# Patient Record
Sex: FEMALE | Race: WHITE | Employment: STUDENT | URBAN - METROPOLITAN AREA
[De-identification: names, ages, dates, MRNs, and addresses within clinical notes are randomized per-mention and may not be internally consistent; named-entity substitution may affect disease eponyms.]

---
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
## 2022-07-11 ENCOUNTER — OFFICE VISIT (OUTPATIENT)
Dept: FAMILY MEDICINE CLINIC | Facility: CLINIC | Age: 22
End: 2022-07-11
Payer: COMMERCIAL

## 2022-07-11 VITALS
BODY MASS INDEX: 23.42 KG/M2 | HEIGHT: 65.83 IN | DIASTOLIC BLOOD PRESSURE: 68 MMHG | WEIGHT: 144 LBS | TEMPERATURE: 98 F | HEART RATE: 72 BPM | SYSTOLIC BLOOD PRESSURE: 96 MMHG

## 2022-07-11 DIAGNOSIS — Z23 NEED FOR VACCINATION: ICD-10-CM

## 2022-07-11 DIAGNOSIS — Z87.42 HISTORY OF MENORRHAGIA: ICD-10-CM

## 2022-07-11 DIAGNOSIS — Z79.899 NEW MEDICATION ADDED: ICD-10-CM

## 2022-07-11 DIAGNOSIS — K59.01 SLOW TRANSIT CONSTIPATION: ICD-10-CM

## 2022-07-11 DIAGNOSIS — R04.0 FREQUENT EPISTAXIS: ICD-10-CM

## 2022-07-11 DIAGNOSIS — D68.0 VON WILLEBRANDS DISEASE (HCC): ICD-10-CM

## 2022-07-11 DIAGNOSIS — Z00.00 WELL ADULT EXAM: Primary | ICD-10-CM

## 2022-07-11 DIAGNOSIS — R11.0 NAUSEA: ICD-10-CM

## 2022-07-11 PROCEDURE — 3074F SYST BP LT 130 MM HG: CPT | Performed by: FAMILY MEDICINE

## 2022-07-11 PROCEDURE — 3078F DIAST BP <80 MM HG: CPT | Performed by: FAMILY MEDICINE

## 2022-07-11 PROCEDURE — 99385 PREV VISIT NEW AGE 18-39: CPT | Performed by: FAMILY MEDICINE

## 2022-07-11 PROCEDURE — 90715 TDAP VACCINE 7 YRS/> IM: CPT | Performed by: FAMILY MEDICINE

## 2022-07-11 PROCEDURE — 99214 OFFICE O/P EST MOD 30 MIN: CPT | Performed by: FAMILY MEDICINE

## 2022-07-11 PROCEDURE — 3008F BODY MASS INDEX DOCD: CPT | Performed by: FAMILY MEDICINE

## 2022-07-11 PROCEDURE — 90471 IMMUNIZATION ADMIN: CPT | Performed by: FAMILY MEDICINE

## 2022-07-11 RX ORDER — L. ACIDOPHILUS/BIFID. ANIMALIS 2B CELL
1 CAPSULE ORAL DAILY
COMMUNITY

## 2022-07-11 RX ORDER — ONDANSETRON 4 MG/1
TABLET, FILM COATED ORAL EVERY 12 HOURS PRN
Qty: 20 TABLET | Refills: 0 | Status: SHIPPED | OUTPATIENT
Start: 2022-07-11

## 2022-07-12 PROBLEM — Z87.42 HISTORY OF MENORRHAGIA: Status: ACTIVE | Noted: 2022-07-12

## 2022-07-12 PROBLEM — K59.01 SLOW TRANSIT CONSTIPATION: Status: ACTIVE | Noted: 2022-07-12

## 2022-07-12 PROBLEM — R04.0 FREQUENT EPISTAXIS: Status: ACTIVE | Noted: 2022-07-12

## 2022-07-12 PROBLEM — R11.0 NAUSEA: Status: ACTIVE | Noted: 2022-07-12

## 2022-07-27 ENCOUNTER — APPOINTMENT (OUTPATIENT)
Dept: HEMATOLOGY/ONCOLOGY | Age: 22
End: 2022-07-27
Attending: INTERNAL MEDICINE
Payer: COMMERCIAL

## 2022-08-04 ENCOUNTER — LABORATORY ENCOUNTER (OUTPATIENT)
Dept: LAB | Age: 22
End: 2022-08-04
Attending: FAMILY MEDICINE
Payer: COMMERCIAL

## 2022-08-04 DIAGNOSIS — D68.0 VON WILLEBRANDS DISEASE (HCC): ICD-10-CM

## 2022-08-04 DIAGNOSIS — K59.01 SLOW TRANSIT CONSTIPATION: ICD-10-CM

## 2022-08-04 DIAGNOSIS — R11.0 NAUSEA: ICD-10-CM

## 2022-08-04 LAB
BASOPHILS # BLD AUTO: 0.03 X10(3) UL (ref 0–0.2)
BASOPHILS NFR BLD AUTO: 0.7 %
DEPRECATED HBV CORE AB SER IA-ACNC: 15.7 NG/ML
EOSINOPHIL # BLD AUTO: 0.07 X10(3) UL (ref 0–0.7)
EOSINOPHIL NFR BLD AUTO: 1.5 %
ERYTHROCYTE [DISTWIDTH] IN BLOOD BY AUTOMATED COUNT: 13.3 %
HCT VFR BLD AUTO: 42.7 %
HGB BLD-MCNC: 13.6 G/DL
IMM GRANULOCYTES # BLD AUTO: 0 X10(3) UL (ref 0–1)
IMM GRANULOCYTES NFR BLD: 0 %
IRON SATN MFR SERPL: 12 %
IRON SERPL-MCNC: 65 UG/DL
LIPASE SERPL-CCNC: 127 U/L (ref 73–393)
LYMPHOCYTES # BLD AUTO: 2.34 X10(3) UL (ref 1–4)
LYMPHOCYTES NFR BLD AUTO: 51.3 %
MCH RBC QN AUTO: 28.1 PG (ref 26–34)
MCHC RBC AUTO-ENTMCNC: 31.9 G/DL (ref 31–37)
MCV RBC AUTO: 88.2 FL
MONOCYTES # BLD AUTO: 0.43 X10(3) UL (ref 0.1–1)
MONOCYTES NFR BLD AUTO: 9.4 %
NEUTROPHILS # BLD AUTO: 1.69 X10 (3) UL (ref 1.5–7.7)
NEUTROPHILS # BLD AUTO: 1.69 X10(3) UL (ref 1.5–7.7)
NEUTROPHILS NFR BLD AUTO: 37.1 %
PLATELET # BLD AUTO: 227 10(3)UL (ref 150–450)
RBC # BLD AUTO: 4.84 X10(6)UL
TIBC SERPL-MCNC: 562 UG/DL (ref 240–450)
TRANSFERRIN SERPL-MCNC: 377 MG/DL (ref 200–360)
VIT B12 SERPL-MCNC: 327 PG/ML (ref 193–986)
WBC # BLD AUTO: 4.6 X10(3) UL (ref 4–11)

## 2022-08-04 PROCEDURE — 82785 ASSAY OF IGE: CPT | Performed by: FAMILY MEDICINE

## 2022-08-04 PROCEDURE — 82728 ASSAY OF FERRITIN: CPT | Performed by: FAMILY MEDICINE

## 2022-08-04 PROCEDURE — 86003 ALLG SPEC IGE CRUDE XTRC EA: CPT | Performed by: FAMILY MEDICINE

## 2022-08-04 PROCEDURE — 82607 VITAMIN B-12: CPT | Performed by: FAMILY MEDICINE

## 2022-08-04 PROCEDURE — 83540 ASSAY OF IRON: CPT | Performed by: FAMILY MEDICINE

## 2022-08-04 PROCEDURE — 83690 ASSAY OF LIPASE: CPT | Performed by: FAMILY MEDICINE

## 2022-08-04 PROCEDURE — 83550 IRON BINDING TEST: CPT | Performed by: FAMILY MEDICINE

## 2022-08-04 PROCEDURE — 85025 COMPLETE CBC W/AUTO DIFF WBC: CPT | Performed by: FAMILY MEDICINE

## 2022-08-06 NOTE — PROGRESS NOTES
Iron storage is on the low end of normal.  Eat foods that are rich in iron. Normal complete blood count white blood cells and red blood cell count normal  Lipase is normal no pancreas issues. Vitamin B12 is low goal above 400 start methylcobalamin chewable or sublingual (vitamin B12) 1,000 mcg daily. Vitamin B12 can help with energy, nerve pain and anxiety.

## 2022-08-08 LAB
CLAM IGE QN: <0.1 KUA/L (ref ?–0.1)
CODFISH IGE QN: <0.1 KUA/L (ref ?–0.1)
CORN IGE QN: <0.1 KUA/L (ref ?–0.1)
COW MILK IGE QN: <0.1 KUA/L (ref ?–0.1)
EGG WHITE IGE QN: <0.1 KUA/L (ref ?–0.1)
IGE SERPL-ACNC: 2.07 KU/L (ref 2–214)
PEANUT IGE QN: <0.1 KUA/L (ref ?–0.1)
SCALLOP IGE QN: <0.1 KUA/L (ref ?–0.1)
SESAME SEED IGE QN: <0.1 KUA/L (ref ?–0.1)
SHRIMP IGE QN: <0.1 KUA/L (ref ?–0.1)
SOYBEAN IGE QN: <0.1 KUA/L (ref ?–0.1)
WALNUT IGE QN: <0.1 KUA/L (ref ?–0.1)
WHEAT IGE QN: <0.1 KUA/L (ref ?–0.1)

## 2022-08-15 ENCOUNTER — OFFICE VISIT (OUTPATIENT)
Dept: HEMATOLOGY/ONCOLOGY | Facility: HOSPITAL | Age: 22
End: 2022-08-15
Attending: INTERNAL MEDICINE
Payer: COMMERCIAL

## 2022-08-15 VITALS
OXYGEN SATURATION: 98 % | WEIGHT: 146.38 LBS | BODY MASS INDEX: 23.81 KG/M2 | HEART RATE: 75 BPM | SYSTOLIC BLOOD PRESSURE: 126 MMHG | DIASTOLIC BLOOD PRESSURE: 84 MMHG | HEIGHT: 65.83 IN | TEMPERATURE: 99 F

## 2022-08-15 DIAGNOSIS — D50.9 IRON DEFICIENCY ANEMIA, UNSPECIFIED IRON DEFICIENCY ANEMIA TYPE: ICD-10-CM

## 2022-08-15 DIAGNOSIS — D68.0 VON WILLEBRANDS DISEASE (HCC): Primary | ICD-10-CM

## 2022-08-15 DIAGNOSIS — R04.0 FREQUENT EPISTAXIS: ICD-10-CM

## 2022-08-15 DIAGNOSIS — K06.8 GUMS, BLEEDING: ICD-10-CM

## 2022-08-15 DIAGNOSIS — Z87.42 HISTORY OF MENORRHAGIA: ICD-10-CM

## 2022-08-15 PROCEDURE — 99245 OFF/OP CONSLTJ NEW/EST HI 55: CPT | Performed by: INTERNAL MEDICINE

## 2022-08-15 RX ORDER — AMINOCAPROIC ACID 0.25 G/ML
4 SYRUP ORAL EVERY 6 HOURS PRN
Qty: 1 EACH | Refills: 1 | Status: SHIPPED | OUTPATIENT
Start: 2022-08-15 | End: 2022-08-16

## 2022-08-15 NOTE — PATIENT INSTRUCTIONS
For triage nurse: [phone] Monday through Friday 7:30-5:00.  *Please note this is a new phone number*    After hours or weekends for emergent needs:  [phone].      To schedule diagnostic testing: Central [phone]    For Medical Records: [phone]

## 2022-08-15 NOTE — PROGRESS NOTES
Outpatient / Education Record    Learner:   patient    Problems related to:    Disease/diagnosis    Barriers / Limitations:   None    Interventions/Method  Brief focused discussion, reinforcement  Medications/plan of care    Outcomes:  shows understanding    AVS provided and follow up reviewed. Pt instructed to call office for concerns. Maricruz Em

## 2022-08-16 RX ORDER — AMINOCAPROIC ACID 0.25 G/ML
4 SYRUP ORAL EVERY 6 HOURS PRN
Qty: 1 EACH | Refills: 1 | Status: SHIPPED | OUTPATIENT
Start: 2022-08-16

## 2022-08-18 ENCOUNTER — OFFICE VISIT (OUTPATIENT)
Dept: HEMATOLOGY/ONCOLOGY | Facility: HOSPITAL | Age: 22
End: 2022-08-18
Attending: INTERNAL MEDICINE
Payer: COMMERCIAL

## 2022-08-18 VITALS
TEMPERATURE: 98 F | HEART RATE: 60 BPM | OXYGEN SATURATION: 100 % | SYSTOLIC BLOOD PRESSURE: 138 MMHG | RESPIRATION RATE: 18 BRPM | DIASTOLIC BLOOD PRESSURE: 89 MMHG

## 2022-08-18 DIAGNOSIS — D50.9 IRON DEFICIENCY ANEMIA, UNSPECIFIED IRON DEFICIENCY ANEMIA TYPE: Primary | ICD-10-CM

## 2022-08-18 PROCEDURE — 96365 THER/PROPH/DIAG IV INF INIT: CPT

## 2022-08-18 PROCEDURE — 96376 TX/PRO/DX INJ SAME DRUG ADON: CPT

## 2022-08-18 RX ORDER — ONDANSETRON 4 MG/1
TABLET, FILM COATED ORAL
Qty: 20 TABLET | Refills: 0 | Status: SHIPPED | OUTPATIENT
Start: 2022-08-18

## 2022-08-18 NOTE — PROGRESS NOTES
Pt here for iron infusion. Arrives Ambulating independently, accompanied by self           Modifications in dose or schedule: No     Frequency of blood return and site check throughout administration: Prior to administration   Discharged to Home, Ambulating independently, accompanied by: self    Outpatient Oncology Care Plan  Problem list:  anemia  Problems related to:    disease/disease progression  Interventions:  administered iron  Expected outcomes:  optimal lab values  Progress towards outcome:  making progress    Education Record    Learner:  Patient  Barriers / Limitations:  None  Method:  Brief focused  Outcome:  Shows understanding  Comments:observed pt 30 min post infusion. Pt tolerated infusion. No c/o. VSS.  See flowsheet

## 2022-10-16 RX ORDER — ONDANSETRON 4 MG/1
TABLET, FILM COATED ORAL
Qty: 20 TABLET | Refills: 0 | Status: SHIPPED | OUTPATIENT
Start: 2022-10-16

## 2022-11-17 ENCOUNTER — APPOINTMENT (OUTPATIENT)
Dept: HEMATOLOGY/ONCOLOGY | Age: 22
End: 2022-11-17
Attending: INTERNAL MEDICINE
Payer: COMMERCIAL

## 2022-11-23 ENCOUNTER — NURSE ONLY (OUTPATIENT)
Dept: HEMATOLOGY/ONCOLOGY | Age: 22
End: 2022-11-23
Attending: INTERNAL MEDICINE
Payer: COMMERCIAL

## 2022-11-23 DIAGNOSIS — D50.9 IRON DEFICIENCY ANEMIA, UNSPECIFIED IRON DEFICIENCY ANEMIA TYPE: ICD-10-CM

## 2022-11-23 DIAGNOSIS — D68.00 VON WILLEBRANDS DISEASE: ICD-10-CM

## 2022-11-23 LAB
APTT PPP: 34.4 SECONDS (ref 23.3–35.6)
BASOPHILS # BLD AUTO: 0.01 X10(3) UL (ref 0–0.2)
BASOPHILS NFR BLD AUTO: 0.1 %
DEPRECATED HBV CORE AB SER IA-ACNC: 164.6 NG/ML
EOSINOPHIL # BLD AUTO: 0.06 X10(3) UL (ref 0–0.7)
EOSINOPHIL NFR BLD AUTO: 0.7 %
ERYTHROCYTE [DISTWIDTH] IN BLOOD BY AUTOMATED COUNT: 13 %
HCT VFR BLD AUTO: 41.5 %
HGB BLD-MCNC: 13.8 G/DL
IMM GRANULOCYTES # BLD AUTO: 0.02 X10(3) UL (ref 0–1)
IMM GRANULOCYTES NFR BLD: 0.2 %
IRON SERPL-MCNC: 66 UG/DL
LYMPHOCYTES # BLD AUTO: 2.55 X10(3) UL (ref 1–4)
LYMPHOCYTES NFR BLD AUTO: 30.1 %
MCH RBC QN AUTO: 28.5 PG (ref 26–34)
MCHC RBC AUTO-ENTMCNC: 33.3 G/DL (ref 31–37)
MCV RBC AUTO: 85.7 FL
MONOCYTES # BLD AUTO: 0.37 X10(3) UL (ref 0.1–1)
MONOCYTES NFR BLD AUTO: 4.4 %
NEUTROPHILS # BLD AUTO: 5.47 X10 (3) UL (ref 1.5–7.7)
NEUTROPHILS # BLD AUTO: 5.47 X10(3) UL (ref 1.5–7.7)
NEUTROPHILS NFR BLD AUTO: 64.5 %
PLATELET # BLD AUTO: 258 10(3)UL (ref 150–450)
RBC # BLD AUTO: 4.84 X10(6)UL
WBC # BLD AUTO: 8.5 X10(3) UL (ref 4–11)

## 2022-11-23 PROCEDURE — 85245 CLOT FACTOR VIII VW RISTOCTN: CPT

## 2022-11-23 PROCEDURE — 36415 COLL VENOUS BLD VENIPUNCTURE: CPT

## 2022-11-23 PROCEDURE — 85730 THROMBOPLASTIN TIME PARTIAL: CPT

## 2022-11-23 PROCEDURE — 85246 CLOT FACTOR VIII VW ANTIGEN: CPT

## 2022-11-23 PROCEDURE — 85025 COMPLETE CBC W/AUTO DIFF WBC: CPT

## 2022-11-23 PROCEDURE — 83540 ASSAY OF IRON: CPT

## 2022-11-23 PROCEDURE — 85240 CLOT FACTOR VIII AHG 1 STAGE: CPT

## 2022-11-23 PROCEDURE — 82728 ASSAY OF FERRITIN: CPT

## 2023-01-12 RX ORDER — ONDANSETRON 4 MG/1
TABLET, FILM COATED ORAL
Qty: 20 TABLET | Refills: 0 | Status: SHIPPED | OUTPATIENT
Start: 2023-01-12

## 2023-01-12 NOTE — TELEPHONE ENCOUNTER
Pt requesting refill of : Ondansetron Hydrochloride 4 Mg Tab Nort    Last Time Medication was Prescribed :  10/16/2022 qty 20   08/18/2022 qty 20  07/11/2022 qty 20 New patient    Last Office Visit with Provider: 07/11/2022     No future appointments.

## 2023-07-24 ENCOUNTER — LAB ENCOUNTER (OUTPATIENT)
Dept: LAB | Age: 23
End: 2023-07-24
Attending: FAMILY MEDICINE
Payer: COMMERCIAL

## 2023-07-24 ENCOUNTER — OFFICE VISIT (OUTPATIENT)
Dept: FAMILY MEDICINE CLINIC | Facility: CLINIC | Age: 23
End: 2023-07-24
Payer: COMMERCIAL

## 2023-07-24 VITALS
BODY MASS INDEX: 23.99 KG/M2 | TEMPERATURE: 98 F | WEIGHT: 144 LBS | OXYGEN SATURATION: 98 % | HEART RATE: 68 BPM | DIASTOLIC BLOOD PRESSURE: 78 MMHG | RESPIRATION RATE: 16 BRPM | HEIGHT: 65 IN | SYSTOLIC BLOOD PRESSURE: 140 MMHG

## 2023-07-24 DIAGNOSIS — Z13.29 SCREENING FOR ENDOCRINE, NUTRITIONAL, METABOLIC AND IMMUNITY DISORDER: ICD-10-CM

## 2023-07-24 DIAGNOSIS — Z00.00 WELL FEMALE EXAM WITHOUT GYNECOLOGICAL EXAM: Primary | ICD-10-CM

## 2023-07-24 DIAGNOSIS — Z79.899 MEDICATION MANAGEMENT: ICD-10-CM

## 2023-07-24 DIAGNOSIS — Z13.6 ENCOUNTER FOR LIPID SCREENING FOR CARDIOVASCULAR DISEASE: ICD-10-CM

## 2023-07-24 DIAGNOSIS — Z13.220 ENCOUNTER FOR LIPID SCREENING FOR CARDIOVASCULAR DISEASE: ICD-10-CM

## 2023-07-24 DIAGNOSIS — D50.9 IRON DEFICIENCY ANEMIA, UNSPECIFIED IRON DEFICIENCY ANEMIA TYPE: ICD-10-CM

## 2023-07-24 DIAGNOSIS — F41.1 GAD (GENERALIZED ANXIETY DISORDER): ICD-10-CM

## 2023-07-24 DIAGNOSIS — Z13.228 SCREENING FOR ENDOCRINE, NUTRITIONAL, METABOLIC AND IMMUNITY DISORDER: ICD-10-CM

## 2023-07-24 DIAGNOSIS — Z13.21 SCREENING FOR ENDOCRINE, NUTRITIONAL, METABOLIC AND IMMUNITY DISORDER: ICD-10-CM

## 2023-07-24 DIAGNOSIS — Z13.0 SCREENING FOR ENDOCRINE, NUTRITIONAL, METABOLIC AND IMMUNITY DISORDER: ICD-10-CM

## 2023-07-24 DIAGNOSIS — D68.00 VON WILLEBRANDS DISEASE (HCC): ICD-10-CM

## 2023-07-24 LAB
ALBUMIN SERPL-MCNC: 4.1 G/DL (ref 3.4–5)
ALBUMIN/GLOB SERPL: 1.3 {RATIO} (ref 1–2)
ALP LIVER SERPL-CCNC: 69 U/L
ALT SERPL-CCNC: 23 U/L
ANION GAP SERPL CALC-SCNC: 1 MMOL/L (ref 0–18)
AST SERPL-CCNC: 15 U/L (ref 15–37)
BASOPHILS # BLD AUTO: 0.03 X10(3) UL (ref 0–0.2)
BASOPHILS NFR BLD AUTO: 0.4 %
BILIRUB SERPL-MCNC: 0.3 MG/DL (ref 0.1–2)
BUN BLD-MCNC: 12 MG/DL (ref 7–18)
CALCIUM BLD-MCNC: 9.5 MG/DL (ref 8.5–10.1)
CHLORIDE SERPL-SCNC: 108 MMOL/L (ref 98–112)
CHOLEST SERPL-MCNC: 180 MG/DL (ref ?–200)
CO2 SERPL-SCNC: 27 MMOL/L (ref 21–32)
CREAT BLD-MCNC: 0.76 MG/DL
DEPRECATED HBV CORE AB SER IA-ACNC: 107.2 NG/ML
EGFRCR SERPLBLD CKD-EPI 2021: 114 ML/MIN/1.73M2 (ref 60–?)
EOSINOPHIL # BLD AUTO: 0.07 X10(3) UL (ref 0–0.7)
EOSINOPHIL NFR BLD AUTO: 0.9 %
ERYTHROCYTE [DISTWIDTH] IN BLOOD BY AUTOMATED COUNT: 13 %
FASTING PATIENT LIPID ANSWER: NO
FASTING STATUS PATIENT QL REPORTED: NO
GLOBULIN PLAS-MCNC: 3.2 G/DL (ref 2.8–4.4)
GLUCOSE BLD-MCNC: 92 MG/DL (ref 70–99)
HCT VFR BLD AUTO: 41.6 %
HDLC SERPL-MCNC: 60 MG/DL (ref 40–59)
HGB BLD-MCNC: 13.6 G/DL
IMM GRANULOCYTES # BLD AUTO: 0.01 X10(3) UL (ref 0–1)
IMM GRANULOCYTES NFR BLD: 0.1 %
IRON SATN MFR SERPL: 15 %
IRON SERPL-MCNC: 74 UG/DL
LDLC SERPL CALC-MCNC: 108 MG/DL (ref ?–100)
LYMPHOCYTES # BLD AUTO: 2.94 X10(3) UL (ref 1–4)
LYMPHOCYTES NFR BLD AUTO: 37.8 %
MCH RBC QN AUTO: 27.9 PG (ref 26–34)
MCHC RBC AUTO-ENTMCNC: 32.7 G/DL (ref 31–37)
MCV RBC AUTO: 85.4 FL
MONOCYTES # BLD AUTO: 0.58 X10(3) UL (ref 0.1–1)
MONOCYTES NFR BLD AUTO: 7.5 %
NEUTROPHILS # BLD AUTO: 4.15 X10 (3) UL (ref 1.5–7.7)
NEUTROPHILS # BLD AUTO: 4.15 X10(3) UL (ref 1.5–7.7)
NEUTROPHILS NFR BLD AUTO: 53.3 %
NONHDLC SERPL-MCNC: 120 MG/DL (ref ?–130)
OSMOLALITY SERPL CALC.SUM OF ELEC: 281 MOSM/KG (ref 275–295)
PLATELET # BLD AUTO: 283 10(3)UL (ref 150–450)
POTASSIUM SERPL-SCNC: 4.1 MMOL/L (ref 3.5–5.1)
PROT SERPL-MCNC: 7.3 G/DL (ref 6.4–8.2)
RBC # BLD AUTO: 4.87 X10(6)UL
SODIUM SERPL-SCNC: 136 MMOL/L (ref 136–145)
TIBC SERPL-MCNC: 502 UG/DL (ref 240–450)
TRANSFERRIN SERPL-MCNC: 337 MG/DL (ref 200–360)
TRIGL SERPL-MCNC: 66 MG/DL (ref 30–149)
TSI SER-ACNC: 1.32 MIU/ML (ref 0.36–3.74)
VLDLC SERPL CALC-MCNC: 11 MG/DL (ref 0–30)
WBC # BLD AUTO: 7.8 X10(3) UL (ref 4–11)

## 2023-07-24 PROCEDURE — 80061 LIPID PANEL: CPT | Performed by: FAMILY MEDICINE

## 2023-07-24 PROCEDURE — 3008F BODY MASS INDEX DOCD: CPT | Performed by: FAMILY MEDICINE

## 2023-07-24 PROCEDURE — 82728 ASSAY OF FERRITIN: CPT | Performed by: FAMILY MEDICINE

## 2023-07-24 PROCEDURE — 83540 ASSAY OF IRON: CPT | Performed by: FAMILY MEDICINE

## 2023-07-24 PROCEDURE — 83550 IRON BINDING TEST: CPT | Performed by: FAMILY MEDICINE

## 2023-07-24 PROCEDURE — 80050 GENERAL HEALTH PANEL: CPT | Performed by: FAMILY MEDICINE

## 2023-07-24 PROCEDURE — 3078F DIAST BP <80 MM HG: CPT | Performed by: FAMILY MEDICINE

## 2023-07-24 PROCEDURE — 3077F SYST BP >= 140 MM HG: CPT | Performed by: FAMILY MEDICINE

## 2023-07-24 PROCEDURE — 99213 OFFICE O/P EST LOW 20 MIN: CPT | Performed by: FAMILY MEDICINE

## 2023-07-24 PROCEDURE — 99395 PREV VISIT EST AGE 18-39: CPT | Performed by: FAMILY MEDICINE

## 2023-07-25 PROBLEM — R11.0 NAUSEA: Status: RESOLVED | Noted: 2022-07-12 | Resolved: 2023-07-25

## 2023-07-25 NOTE — PROGRESS NOTES
Lipid panel slightly elevated LDL watch saturated fats Decrease saturated fats. Eat lean cuts of meat such as chicken and fish (salmon and tuna). If able, add donald seeds, almonds, walnuts, pumpkin seeds, sunflower seeds, beans, whole grain cereals to diet. Cook with grape seed oil or olive oil. Review the Mediterranean diet on line.   Iron level is normal ferritin (iron storage levels) normal   Thyroid testing is normal  Blood sugar testing is normal, kidney function liver function tests are normal  Red blood cell, white blood cell count is normal.

## 2023-07-31 ENCOUNTER — PATIENT MESSAGE (OUTPATIENT)
Dept: FAMILY MEDICINE CLINIC | Facility: CLINIC | Age: 23
End: 2023-07-31

## 2023-08-01 NOTE — TELEPHONE ENCOUNTER
From: Srini Gaytan  To: Ladarius Espinosa PA-C  Sent: 7/31/2023 12:29 PM CDT  Subject: Vaccination    Hi Dr. Nga Hinojosa! 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 Could we check my records for that by any chance? Thanks!   Marily Herman

## 2024-01-02 RX ORDER — ONDANSETRON 4 MG/1
4 TABLET, FILM COATED ORAL EVERY 8 HOURS PRN
Qty: 20 TABLET | Refills: 0 | Status: SHIPPED | OUTPATIENT
Start: 2024-01-02

## 2024-12-02 DIAGNOSIS — F41.1 GAD (GENERALIZED ANXIETY DISORDER): ICD-10-CM

## 2024-12-02 NOTE — TELEPHONE ENCOUNTER
Requested Prescriptions     Pending Prescriptions Disp Refills    sertraline 50 MG Oral Tab 135 tablet 0     Sig: Take 1.5 tablets (75 mg total) by mouth daily.       Last Refill: 7/24/23    Last OV: 7/24/23    Patient will be in town [facility]. Can Regi Norris PA-C fit in for an appointment?     Please review and advsie

## 2024-12-02 NOTE — TELEPHONE ENCOUNTER
Place her in the week that she is in use one of the hour slots where a patient is in for a physical ask Angelica if you need help

## 2024-12-02 NOTE — TELEPHONE ENCOUNTER
Harriet Lemus requesting medication refill for sertraline 50 MG Oral Tab () .    LOV: Visit date not found   Prescribed By: Regi Norris PA-C   Pharmacy Location: [facility]     Next Appointment: No appts available with Regi Norris PA-C, she is coming back in town for the holidays (the week of ) if Regi Norris PA-C could accommodate her       Informed patient to allow up to 24 to 48 business hours before checking with Pharmacy.

## 2024-12-05 NOTE — TELEPHONE ENCOUNTER
Patient called back, spoke to Regi Norris PA-C MA, MA will call her back to set up appointment.   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

## 2024-12-05 NOTE — TELEPHONE ENCOUNTER
Called patient and left her detailed voicemail about sertraline not being prescribe by  Roger Norris and was being prescribed by her midwife and obgyn Dr. Candice Germain he is also filling her birth control. Patient last office visit note was on 07/24/2023 and was asked to return in July 2023 however patient didn't return.  Informed patient that roger doesn't have hours after 3:30 p.m. and she is booking out until the start of March and will only be in office two days a week from 7-3 and no hours after 3 p.m. Informed patient she could look on [facility] website to find a new physician if Roger's dates and times don't work for her schedule.

## 2024-12-30 ENCOUNTER — OFFICE VISIT (OUTPATIENT)
Dept: FAMILY MEDICINE CLINIC | Facility: CLINIC | Age: 24
End: 2024-12-30
Payer: COMMERCIAL

## 2024-12-30 VITALS
RESPIRATION RATE: 16 BRPM | BODY MASS INDEX: 25.05 KG/M2 | WEIGHT: 150.38 LBS | DIASTOLIC BLOOD PRESSURE: 64 MMHG | HEART RATE: 64 BPM | OXYGEN SATURATION: 98 % | HEIGHT: 65 IN | TEMPERATURE: 99 F | SYSTOLIC BLOOD PRESSURE: 110 MMHG

## 2024-12-30 DIAGNOSIS — Z00.00 WELLNESS EXAMINATION: Primary | ICD-10-CM

## 2024-12-30 DIAGNOSIS — D50.9 IRON DEFICIENCY ANEMIA, UNSPECIFIED IRON DEFICIENCY ANEMIA TYPE: ICD-10-CM

## 2024-12-30 DIAGNOSIS — Z13.1 SCREENING FOR DIABETES MELLITUS: ICD-10-CM

## 2024-12-30 DIAGNOSIS — Z87.42 HISTORY OF MENORRHAGIA: ICD-10-CM

## 2024-12-30 DIAGNOSIS — F41.1 GAD (GENERALIZED ANXIETY DISORDER): ICD-10-CM

## 2024-12-30 DIAGNOSIS — D68.00 VON WILLEBRANDS DISEASE (HCC): ICD-10-CM

## 2024-12-30 DIAGNOSIS — Z00.00 LABORATORY EXAM ORDERED AS PART OF ROUTINE GENERAL MEDICAL EXAMINATION: ICD-10-CM

## 2024-12-30 RX ORDER — NEOMYCIN SULFATE, POLYMYXIN B SULFATE AND HYDROCORTISONE 10; 3.5; 1 MG/ML; MG/ML; [USP'U]/ML
SUSPENSION/ DROPS AURICULAR (OTIC)
COMMUNITY
Start: 2024-12-24

## 2024-12-30 RX ORDER — NORETHINDRONE ACETATE AND ETHINYL ESTRADIOL 1MG-20(21)
1 KIT ORAL DAILY
COMMUNITY
Start: 2024-10-23 | End: 2024-12-30 | Stop reason: ALTCHOICE

## 2024-12-30 RX ORDER — AMOXICILLIN 500 MG/1
CAPSULE ORAL
COMMUNITY
Start: 2024-12-24

## 2024-12-30 NOTE — PROGRESS NOTES
The 21st Century Cures Act makes medical notes like these available to patients in the interest of transparency. Please be advised this is a medical document. Medical documents are intended to carry relevant information, facts as evident, and the clinical opinion of the practitioner. The medical note is intended as peer to peer communication and may appear blunt or direct. It is written in medical language and may contain abbreviations or verbiage that are unfamiliar.     Harriet Lemus is a 24 year old female who is here for   Chief Complaint   Patient presents with    Physical     Reviewed Preventative/Wellness form with patient.         HPI:     This 24-year-old female who is a new patient to me and former patient of EDMUNDO Crain presents to the office for her annual wellness exam and to establish care.    The patient is currently on sertraline 50 mg daily for her generalized anxiety disorder and needs a refill.  She had been up to 75 mg but decreased the dose to 50 and states she is doing well on this dose.    The patient has previous history for frequent epistaxis and heavy menses and was seen by Dr. Wilson of hematology for possible von Willebrand's disease.  The patient states her bleeding has improved.  She states her parents never did go for the genetic testing.  She thinks her brother was recently diagnosed with von Willebrand's as well.  The patient also has history for iron deficiency anemia and had required an iron infusion previously.  She is not currently taking any iron supplements and would like to have her blood count and iron studies rechecked.  She denies any blood in the urine or stool or melena.    The patient is currently on birth control pills for management of her menorrhagia.  She states this has helped.  She denies ever being sexually active.    She is currently taking amoxicillin and using eardrops for an ear infection which was diagnosed a week ago.  She states the ear pain has  improved.  She is not having any fever or chills or ear discharge.  She does have some nasal congestion and occasional cough.  She is denying any chest pain, shortness of breath, nausea, vomiting, diarrhea.    The patient lives in [place] and works as a .    Exercise: walking 2-3 days a week.  Diet: watches somewhat  Sleep: 8 hours     Depression/Anxiety:   PHQ-2 SCORE: 0  , done 2024   Feeling tired or having little energy: 2    If you checked off any problems, how difficult have these problems made it for you to do your work, take care of things at home, or get along with other people?: Not difficult at all         Fall Risk: No falls last 3 months  Gun in home:No           Glasses/contacts:Glasses             Recent eye doctor visit:Yes   Hearing aids:No    Family History for:  Breast ca- No  Ovarian ca- No  Uterine ca- No  Colon ca- No      FDLMP 1 week ago, on BCP's, not sexually active  Last pap smear: 2023    Colonoscopy: Never      Past Medical History:    Bilateral bunions    Depression    Von Willebrand disease (HCC)       Past Surgical History:   Procedure Laterality Date    Bunionectomy Bilateral 2019    [facility] in [place], [place]    Tonsillectomy  2005    [facility] in [place], [place]    Creole teeth removed Bilateral 2018       Family History   Problem Relation Age of Onset    High Blood Pressure Father     High Blood Pressure Mother     Other (bunions) Mother     No Known Problems Brother     High Blood Pressure Maternal Grandmother     Heart Attack Maternal Grandmother     No Known Problems Paternal Grandmother     No Known Problems Paternal Grandfather        Social History     Socioeconomic History    Marital status: Single   Tobacco Use    Smoking status: Never    Smokeless tobacco: Never   Vaping Use    Vaping status: Never Used   Substance and Sexual Activity    Alcohol use: Yes    Drug use: Never    Sexual activity: Not Currently     Partners: Male      Birth control/protection: Pill   Other Topics Concern     Service No    Blood Transfusions No    Caffeine Concern Yes     Comment: 1 coffee and 1 iced tea daily    Occupational Exposure No    Hobby Hazards No    Sleep Concern No    Stress Concern Yes    Weight Concern No    Special Diet No    Back Care No    Exercise Yes    Bike Helmet No    Seat Belt Yes    Self-Exams No     Works as a     Medications:    Medications Ordered Prior to Encounter[1]    Allergies:    Allergies[2]    REVIEW OF SYSTEMS:     ROS is negative except as stated in HPI.      EXAM:   /64 (BP Location: Left arm, Patient Position: Sitting, Cuff Size: large)   Pulse 64   Temp 98.5 °F (36.9 °C) (Temporal)   Resp 16   Ht 5' 5\" (1.651 m)   Wt 150 lb 6.4 oz (68.2 kg)   LMP 12/22/2024 (Approximate)   SpO2 98%   BMI 25.03 kg/m²     Wt Readings from Last 3 Encounters:   12/30/24 150 lb 6.4 oz (68.2 kg)   07/24/23 144 lb (65.3 kg)   08/15/22 146 lb 6.4 oz (66.4 kg)       BP Readings from Last 3 Encounters:   12/30/24 110/64   07/24/23 140/78   08/18/22 138/89        Visual Acuity  Right Eye Visual Acuity: Uncorrected Right Eye Chart Acuity: 20/25   Left Eye Visual Acuity: Uncorrected Left Eye Chart Acuity: 20/25   Both Eyes Visual Acuity: Uncorrected Both Eyes Chart Acuity: 20/25   Able To Tolerate Visual Acuity: Yes      Weight is up 6 # from 7/24/2023 OV    General: WH/WN/WD, in NAD, A and O times 3  HEAD: Normocephalic, atraumatic  EYES: HODAN, EOMI, conjunctiva normal, sclera anicteric.  EARS: Tympanic membranes normal, EAC's normal.  NOSE: Turbinates mildly congested, no bleeding noted.  PHARYNX:  No eythema or exudates, mucous membrane moist, airway patent.  NECK:  No cervical lymphadenopathy or thyromegaly, no bruits noted.  HEART: Regular rate and rhythm, no S3, S4 or murmur noted.  LUNGS: Clear to ausculation. No retractions or tachypnea noted.  BREASTS: deferred - sees GYN  ABDOMEN: Soft, nontender, no  guarding, rigidity or rebound, no masses or hepatosplenomegaly, normal bowel sounds in all four quadrants.  : deferred-sees GYN  BACK: No tenderness over the thoracic or lumbar spine. No scoliosis noted.  EXTREMITIES: No clubbing, cyanosis, edema noted. Motor strength +5/5 in all 4 extremities. DTR's +2/4 in all 4 extremities. Able to toe and heel walk.  SKIN: Warm and dry. Tattoo noted.  NEURO: Cr. N. II-XII intact, normal gait  PSYCH: Normal affect and mood.      ASSESSMENT AND PLAN:     1. Wellness examination  -We discussed the following:  Healthy diet and exercise, cancer screening, self breast exams and calcium and vitamin D supplementation.    2. Laboratory exam ordered as part of routine general medical examination    - Comp Metabolic Panel (14) [E]  - CBC W Differential W Platelet [E]  - Lipid Panel [E]  - TSH W Reflex To Free T4 [E]  - HCV Antibody [E]; Future    3. Screening for diabetes mellitus    - Hemoglobin A1C [E]    4. Iron deficiency anemia, unspecified iron deficiency anemia type    The patient required iron infusion previously.  She is due for recheck on her CBC and iron studies.  She is not currently taking any iron supplements.    - Ferritin [E]  - Iron And Tibc [E]    5. Von Willebrands disease (HCC)    Patient had been seen by Dr. Wilson on 8/15/2022.  The patient states her parents have never been tested for von Willebrand's.  The patient is not having any excessive bleeding from the nose, in the urine or stools or vaginally currently.    6. PHOENIX (generalized anxiety disorder)    PHQ-9 score is 2, C-SSRS score is no risk, PHOENIX-7 score is 4.    The patient has decreased her dose of sertraline to 50 mg daily and states she is doing well at this dose.  I have refilled her medication.    - sertraline 50 MG Oral Tab; Take 1 tablet (50 mg total) by mouth daily.  Dispense: 90 tablet; Refill: 1    7. History of menorrhagia    The patient sees her gynecologist who is managing her birth control  pills.  Patient states her menstrual bleeding has improved with being on BCP's.         Health Maintenance:    Health Maintenance   Topic Date Due    Chlamydia Screening  12/30/2025    Pap Smear  8/15/2026    Annual Depression Screening  Completed    Annual Physical  12/30/2025    COVID-19 Vaccine (4 - 2024-25 season) 01/30/2025 (Originally 9/1/2024)    DTaP,Tdap,and Td Vaccines (7 - Td or Tdap) 07/11/2032    Influenza Vaccine  Completed    HPV Vaccines  Completed    Pneumococcal Vaccine: Birth to 64yrs  Aged Out         Orders This Visit:  Orders Placed This Encounter   Procedures    Comp Metabolic Panel (14) [E]    CBC W Differential W Platelet [E]    Lipid Panel [E]    TSH W Reflex To Free T4 [E]    Ferritin [E]    Iron And Tibc [E]    Hemoglobin A1C [E]    HCV Antibody [E]       Meds This Visit:  Requested Prescriptions     Signed Prescriptions Disp Refills    sertraline 50 MG Oral Tab 90 tablet 1     Sig: Take 1 tablet (50 mg total) by mouth daily.       Imaging & Referrals:  None       The patient indicates understanding of these issues and agrees to the plan.  The patient is asked to return pending lab results.  Mali Rea DO    Total time: 40 minutes including precharting, H&P, plan of care.    This dictation was performed with a verbal recognition program (DRAGON) and it was checked for errors. It is possible that there are still dictated errors within this office note. If so, please bring any errors to my attention for an addendum. All efforts were made to ensure that this office note is accurate         [1]   Current Outpatient Medications on File Prior to Visit   Medication Sig Dispense Refill    amoxicillin 500 MG Oral Cap TAKE ONE CAPSULE BY MOUTH EVERY EIGHT HOURS FOR 10 DAYS      neomycin-polymyxin-hydrocortisone [id number] Otic Suspension PLACE 4 DROPS INTO AFFECTED EAR(S) 3 TIMES PER DAY FOR 7 DAYS      ondansetron (ZOFRAN) 4 mg tablet Take 1 tablet (4 mg total) by mouth every 8 (eight)  hours as needed for Nausea. 20 tablet 0    Multiple Vitamins-Minerals (ONE-A-DAY WOMENS VITACRAVES) Oral Chew Tab Chew 1 tablet by mouth daily.      SHELBY 24 FE 1-20 MG-MCG(24) Oral Tab        No current facility-administered medications on file prior to visit.   [2] No Known Allergies

## 2024-12-30 NOTE — PATIENT INSTRUCTIONS
Go for your fasting blood tests. Do not eat or drink except for water for at least 8 hours prior to the blood tests. Do not take any vitamins or Biotin for 3 days before your blood tests.    Continue your sertraline 50 mg one tablet daily.    Finish your Amoxicillin.    Ok to stop the ear drops.    Recommendations for exercise are 3-5 times weekly for 30-60 minutes for a minimum of 150-300 minutes.     For premenopausal women and men, 1000 mg of calcium daily is recommended. For postmenopausal women, 1200 mg of calcium daily is recommended.    To help the body absorb and use calcium, vitamin D 2000 international units daily is recommended.    I will contact you with your test results once available.

## 2024-12-31 ENCOUNTER — LAB ENCOUNTER (OUTPATIENT)
Dept: LAB | Age: 24
End: 2024-12-31
Attending: FAMILY MEDICINE
Payer: COMMERCIAL

## 2024-12-31 DIAGNOSIS — Z00.00 LABORATORY EXAM ORDERED AS PART OF ROUTINE GENERAL MEDICAL EXAMINATION: ICD-10-CM

## 2024-12-31 LAB
ALBUMIN SERPL-MCNC: 4.5 G/DL (ref 3.2–4.8)
ALBUMIN/GLOB SERPL: 1.6 {RATIO} (ref 1–2)
ALP LIVER SERPL-CCNC: 60 U/L
ALT SERPL-CCNC: 14 U/L
ANION GAP SERPL CALC-SCNC: 8 MMOL/L (ref 0–18)
AST SERPL-CCNC: 23 U/L (ref ?–34)
BASOPHILS # BLD AUTO: 0.03 X10(3) UL (ref 0–0.2)
BASOPHILS NFR BLD AUTO: 0.5 %
BILIRUB SERPL-MCNC: 0.5 MG/DL (ref 0.3–1.2)
BUN BLD-MCNC: 14 MG/DL (ref 9–23)
CALCIUM BLD-MCNC: 9.6 MG/DL (ref 8.7–10.4)
CHLORIDE SERPL-SCNC: 107 MMOL/L (ref 98–112)
CHOLEST SERPL-MCNC: 198 MG/DL (ref ?–200)
CO2 SERPL-SCNC: 24 MMOL/L (ref 21–32)
CREAT BLD-MCNC: 0.77 MG/DL
DEPRECATED HBV CORE AB SER IA-ACNC: 33 NG/ML
EGFRCR SERPLBLD CKD-EPI 2021: 111 ML/MIN/1.73M2 (ref 60–?)
EOSINOPHIL # BLD AUTO: 0.09 X10(3) UL (ref 0–0.7)
EOSINOPHIL NFR BLD AUTO: 1.4 %
ERYTHROCYTE [DISTWIDTH] IN BLOOD BY AUTOMATED COUNT: 13.2 %
EST. AVERAGE GLUCOSE BLD GHB EST-MCNC: 105 MG/DL (ref 68–126)
FASTING PATIENT LIPID ANSWER: YES
FASTING STATUS PATIENT QL REPORTED: YES
GLOBULIN PLAS-MCNC: 2.8 G/DL (ref 2–3.5)
GLUCOSE BLD-MCNC: 85 MG/DL (ref 70–99)
HBA1C MFR BLD: 5.3 % (ref ?–5.7)
HCT VFR BLD AUTO: 43.9 %
HCV AB SERPL QL IA: NONREACTIVE
HDLC SERPL-MCNC: 65 MG/DL (ref 40–59)
HGB BLD-MCNC: 14.7 G/DL
IMM GRANULOCYTES # BLD AUTO: 0 X10(3) UL (ref 0–1)
IMM GRANULOCYTES NFR BLD: 0 %
IRON SATN MFR SERPL: 25 %
IRON SERPL-MCNC: 114 UG/DL
LDLC SERPL CALC-MCNC: 118 MG/DL (ref ?–100)
LYMPHOCYTES # BLD AUTO: 2.76 X10(3) UL (ref 1–4)
LYMPHOCYTES NFR BLD AUTO: 42.3 %
MCH RBC QN AUTO: 28.5 PG (ref 26–34)
MCHC RBC AUTO-ENTMCNC: 33.5 G/DL (ref 31–37)
MCV RBC AUTO: 85.1 FL
MONOCYTES # BLD AUTO: 0.36 X10(3) UL (ref 0.1–1)
MONOCYTES NFR BLD AUTO: 5.5 %
NEUTROPHILS # BLD AUTO: 3.28 X10 (3) UL (ref 1.5–7.7)
NEUTROPHILS # BLD AUTO: 3.28 X10(3) UL (ref 1.5–7.7)
NEUTROPHILS NFR BLD AUTO: 50.3 %
NONHDLC SERPL-MCNC: 133 MG/DL (ref ?–130)
OSMOLALITY SERPL CALC.SUM OF ELEC: 288 MOSM/KG (ref 275–295)
PLATELET # BLD AUTO: 321 10(3)UL (ref 150–450)
POTASSIUM SERPL-SCNC: 4.2 MMOL/L (ref 3.5–5.1)
PROT SERPL-MCNC: 7.3 G/DL (ref 5.7–8.2)
RBC # BLD AUTO: 5.16 X10(6)UL
SODIUM SERPL-SCNC: 139 MMOL/L (ref 136–145)
TOTAL IRON BINDING CAPACITY: 457 UG/DL (ref 250–425)
TRANSFERRIN SERPL-MCNC: 401 MG/DL (ref 250–380)
TRIGL SERPL-MCNC: 81 MG/DL (ref 30–149)
TSI SER-ACNC: 1.75 UIU/ML (ref 0.55–4.78)
VLDLC SERPL CALC-MCNC: 14 MG/DL (ref 0–30)
WBC # BLD AUTO: 6.5 X10(3) UL (ref 4–11)

## 2024-12-31 PROCEDURE — 83540 ASSAY OF IRON: CPT | Performed by: FAMILY MEDICINE

## 2024-12-31 PROCEDURE — 83550 IRON BINDING TEST: CPT | Performed by: FAMILY MEDICINE

## 2024-12-31 PROCEDURE — 80061 LIPID PANEL: CPT | Performed by: FAMILY MEDICINE

## 2024-12-31 PROCEDURE — 82728 ASSAY OF FERRITIN: CPT | Performed by: FAMILY MEDICINE

## 2024-12-31 PROCEDURE — 86803 HEPATITIS C AB TEST: CPT

## 2024-12-31 PROCEDURE — 36415 COLL VENOUS BLD VENIPUNCTURE: CPT | Performed by: FAMILY MEDICINE

## 2024-12-31 PROCEDURE — 84443 ASSAY THYROID STIM HORMONE: CPT | Performed by: FAMILY MEDICINE

## 2024-12-31 PROCEDURE — 83036 HEMOGLOBIN GLYCOSYLATED A1C: CPT | Performed by: FAMILY MEDICINE

## 2024-12-31 PROCEDURE — 85025 COMPLETE CBC W/AUTO DIFF WBC: CPT | Performed by: FAMILY MEDICINE

## 2024-12-31 PROCEDURE — 80053 COMPREHEN METABOLIC PANEL: CPT | Performed by: FAMILY MEDICINE

## 2025-01-02 PROBLEM — E78.00 ELEVATED LDL CHOLESTEROL LEVEL: Status: ACTIVE | Noted: 2025-01-02

## 2025-01-08 DIAGNOSIS — F41.1 GAD (GENERALIZED ANXIETY DISORDER): ICD-10-CM

## 2025-01-12 ENCOUNTER — PATIENT MESSAGE (OUTPATIENT)
Dept: FAMILY MEDICINE CLINIC | Facility: CLINIC | Age: 25
End: 2025-01-12

## 2025-01-14 ENCOUNTER — TELEPHONE (OUTPATIENT)
Dept: FAMILY MEDICINE CLINIC | Facility: CLINIC | Age: 25
End: 2025-01-14

## 2025-01-14 NOTE — TELEPHONE ENCOUNTER
Incoming fax received from: [facility]  Regardin day refill request for sertraline 50 MG

## 2025-02-18 DIAGNOSIS — F41.1 GAD (GENERALIZED ANXIETY DISORDER): ICD-10-CM
